# Patient Record
Sex: MALE | Race: BLACK OR AFRICAN AMERICAN | Employment: FULL TIME | URBAN - NONMETROPOLITAN AREA
[De-identification: names, ages, dates, MRNs, and addresses within clinical notes are randomized per-mention and may not be internally consistent; named-entity substitution may affect disease eponyms.]

---

## 2017-11-01 ENCOUNTER — HOSPITAL ENCOUNTER (EMERGENCY)
Age: 57
Discharge: HOME | End: 2017-11-01
Attending: EMERGENCY MEDICINE
Payer: COMMERCIAL

## 2017-11-01 VITALS
HEIGHT: 69 IN | DIASTOLIC BLOOD PRESSURE: 80 MMHG | SYSTOLIC BLOOD PRESSURE: 119 MMHG | RESPIRATION RATE: 16 BRPM | TEMPERATURE: 96.7 F | WEIGHT: 145 LBS | HEART RATE: 64 BPM | BODY MASS INDEX: 21.48 KG/M2 | OXYGEN SATURATION: 100 %

## 2017-11-01 DIAGNOSIS — K64.4 HEMORRHOIDS, EXTERNAL WITHOUT COMPLICATIONS: ICD-10-CM

## 2017-11-01 DIAGNOSIS — K40.90 REDUCIBLE LEFT INGUINAL HERNIA: Primary | ICD-10-CM

## 2017-11-01 PROCEDURE — 99283 EMERGENCY DEPT VISIT LOW MDM: CPT

## 2017-11-01 ASSESSMENT — ENCOUNTER SYMPTOMS
WHEEZING: 0
RHINORRHEA: 0
CONSTIPATION: 0
SORE THROAT: 0
SINUS PRESSURE: 0
NAUSEA: 0
TROUBLE SWALLOWING: 0
SHORTNESS OF BREATH: 0
VOICE CHANGE: 0
DIARRHEA: 0
BACK PAIN: 0
VOMITING: 0
CHEST TIGHTNESS: 0
ABDOMINAL PAIN: 0
COUGH: 0

## 2017-11-01 ASSESSMENT — PAIN SCALES - GENERAL
PAINLEVEL_OUTOF10: 1
PAINLEVEL_OUTOF10: 7

## 2017-11-01 ASSESSMENT — PAIN DESCRIPTION - LOCATION: LOCATION: ABDOMEN;RECTUM

## 2017-11-01 NOTE — ED PROVIDER NOTES
[address] [address]   eMERGENCY dEPARTMENT eNCOUnter        [facility]    Chief Complaint   Patient presents with    Hernia    Other     hemmorroids       Nurses Notes reviewed and I agree except as noted in the HPI. HPI    Afsaneh Weston is a 56 y.o. male who presents for evaluation of Left inguinal hernia which has been ongoing for a month, patient states that the hernia started small however is getting bigger lately. The patient denies any nausea no vomiting. Patient also has been complaining of hemorrhoid , this been ongoing for a while however it flared up from time to time and lately has been flaring up after he had bouts of constipation. To be any fever, no chills, no shortness of breath or chest pain    REVIEW OF SYSTEMS    Review of Systems   Constitutional: Negative for appetite change, chills, diaphoresis, fatigue and fever. HENT: Negative for congestion, ear discharge, ear pain, postnasal drip, rhinorrhea, sinus pressure, sore throat, trouble swallowing and voice change. Respiratory: Negative for cough, chest tightness, shortness of breath and wheezing. Cardiovascular: Negative for chest pain, palpitations and leg swelling. Gastrointestinal: Negative for abdominal pain, constipation, diarrhea, nausea and vomiting. Genitourinary:        Left inguinal hernia and hemorrhoid   Musculoskeletal: Negative for arthralgias, back pain, joint swelling, myalgias, neck pain and neck stiffness. Skin: Negative for rash. Neurological: Negative for dizziness, syncope, weakness, light-headedness, numbness and headaches. PAST MEDICAL HISTORY     has no past medical history on file. SURGICAL HISTORY     has a past surgical history that includes Abdomen surgery. CURRENT MEDICATIONS    Previous Medications    No medications on file       ALLERGIES    has No Known Allergies. FAMILY HISTORY    has no family status information on file.     family DECISION MAKING    DIFFERENTIAL DIAGNOSIS:  Inguinal hernia, reducible, external hemorrhoid      DIAGNOSTIC RESULTS    RADIOLOGY:    No orders to display         Vitals:    Vitals:    11/01/17 1109   BP: (!) 157/107   Pulse: 65   Resp: 16   Temp: 96.7 °F (35.9 °C)   SpO2: 100%   Weight: 145 lb (65.8 kg)   Height: 5' 9\" (1.753 m)       EMERGENCY DEPARTMENT COURSE:    Medications - No data to display    The pt was seen and evaluated by me. Within the department, I observed the pt's vital signs to be within acceptable range. I observed the pt's condition to Stable during the duration of their stay. I explained my proposed course of treatment to the pt, and they were amenable to my decision. They were discharged home, and they will return to the ED if their symptoms become more severe in nature, or otherwise change. Advised the patient regarding hot sitz baths , high-fiber diet avoiding constipation. Controlled Substances Monitoring:   Attestation: The Prescription Monitoring Report for this patient was reviewed today. Laxmi Bruce PA-C)  Documentation: No signs of potential drug abuse or diversion identified. Laxmi Bruce PA-C)    CRITICAL CARE:   None. CONSULTS:  None    PROCEDURES:  None. FINAL IMPRESSION       1. Reducible left inguinal hernia    2. Hemorrhoids, external without complications          DISPOSITION/PLAN  PATIENT REFERRED TO:  Eliu Chawla MD  [address] [place] [address].  [address] [facility]  [phone]    Call in 2 days  For follow-up appointment regarding hernia    DISCHARGE MEDICATIONS:  New Prescriptions    HYDROCORTISONE (ANUSOL-HC) 2.5 % RECTAL CREAM    Place rectally 2 times daily.  Apply after a hot sitz bath for 10 minutes       (Please note that portions of this note were completed with a voice recognition program.  Efforts were made to edit the dictations but occasionally words are mis-transcribed.)         11/01/17 11:55 AM      Marcelina Patel MD      Emergency room physician           Oscar Haney MD  11/01/17 7664

## 2017-11-01 NOTE — ED TRIAGE NOTES
Patient related, \"was seen at the clinic about 3 weeks ago and they said I needed some test. I thought they were going to set up some things here for me to have some tests. Well I never hear from them so I called them and they told me I needed to come here. I have something going on that when I have a bowel movement it hurts. I have had some blood when I go to the bath room for about a month. I have I think a hernia and it bothers me. Is there any way to fix that without surgery? Observed what appears to be a left inguinal hernia. \"

## 2017-11-01 NOTE — CARE COORDINATION
Brief ED Social Work Assessment  11/1/17, 11:48 AM    Patient stated that he recently moved to the area. He stated that he has insurance but needs a PCP. Provided an SRPS list as requested. Patient denied any needs.

## 2017-11-08 ENCOUNTER — OFFICE VISIT (OUTPATIENT)
Dept: SURGERY | Age: 57
End: 2017-11-08
Payer: COMMERCIAL

## 2017-11-08 ENCOUNTER — HOSPITAL ENCOUNTER (OUTPATIENT)
Age: 57
Discharge: HOME | End: 2017-11-08
Payer: COMMERCIAL

## 2017-11-08 ENCOUNTER — HOSPITAL ENCOUNTER (OUTPATIENT)
Dept: GENERAL RADIOLOGY | Age: 57
Discharge: HOME | End: 2017-11-08
Payer: COMMERCIAL

## 2017-11-08 ENCOUNTER — TELEPHONE (OUTPATIENT)
Dept: FAMILY MEDICINE CLINIC | Age: 57
End: 2017-11-08

## 2017-11-08 VITALS
WEIGHT: 148.3 LBS | OXYGEN SATURATION: 98 % | HEART RATE: 63 BPM | RESPIRATION RATE: 18 BRPM | HEIGHT: 69 IN | SYSTOLIC BLOOD PRESSURE: 136 MMHG | BODY MASS INDEX: 21.97 KG/M2 | DIASTOLIC BLOOD PRESSURE: 76 MMHG | TEMPERATURE: 95.7 F

## 2017-11-08 DIAGNOSIS — R30.0 DYSURIA: ICD-10-CM

## 2017-11-08 DIAGNOSIS — K40.90 INGUINAL HERNIA WITHOUT OBSTRUCTION OR GANGRENE, RECURRENCE NOT SPECIFIED, UNSPECIFIED LATERALITY: ICD-10-CM

## 2017-11-08 DIAGNOSIS — Z01.818 PRE-OP TESTING: ICD-10-CM

## 2017-11-08 DIAGNOSIS — R05.9 COUGH: ICD-10-CM

## 2017-11-08 DIAGNOSIS — K40.90 LEFT INGUINAL HERNIA: Primary | ICD-10-CM

## 2017-11-08 LAB
ANION GAP SERPL CALCULATED.3IONS-SCNC: 16 MEQ/L (ref 8–16)
ANISOCYTOSIS: ABNORMAL
BACTERIA: NORMAL
BASOPHILIA: SLIGHT
BASOPHILS # BLD: 0.9 %
BASOPHILS ABSOLUTE: 0.1 THOU/MM3 (ref 0–0.1)
BILIRUBIN URINE: NEGATIVE
BLOOD, URINE: NEGATIVE
BUN BLDV-MCNC: 12 MG/DL (ref 7–22)
CALCIUM SERPL-MCNC: 9.2 MG/DL (ref 8.5–10.5)
CASTS: NORMAL /LPF
CASTS: NORMAL /LPF
CHARACTER, URINE: NORMAL
CHLORIDE BLD-SCNC: 100 MEQ/L (ref 98–111)
CO2: 24 MEQ/L (ref 23–33)
COLOR: YELLOW
CREAT SERPL-MCNC: 0.8 MG/DL (ref 0.4–1.2)
CRYSTALS: NORMAL
EOSINOPHIL # BLD: 2.2 %
EOSINOPHILS ABSOLUTE: 0.1 THOU/MM3 (ref 0–0.4)
EPITHELIAL CELLS, UA: NORMAL /HPF
GFR SERPL CREATININE-BSD FRML MDRD: > 90 ML/MIN/1.73M2
GLUCOSE BLD-MCNC: 93 MG/DL (ref 70–108)
GLUCOSE, URINE: NEGATIVE MG/DL
HCT VFR BLD CALC: 42.6 % (ref 42–52)
HEMOGLOBIN: 13.7 GM/DL (ref 14–18)
HYPOCHROMIA: ABNORMAL
KETONES, URINE: NEGATIVE
LEUKOCYTE EST, POC: NEGATIVE
LYMPHOCYTES # BLD: 30.4 %
LYMPHOCYTES ABSOLUTE: 2 THOU/MM3 (ref 1–4.8)
MCH RBC QN AUTO: 22.9 PG (ref 27–31)
MCHC RBC AUTO-ENTMCNC: 32.1 GM/DL (ref 33–37)
MCV RBC AUTO: 71.4 FL (ref 80–94)
MICROCYTES: ABNORMAL
MISCELLANEOUS LAB TEST RESULT: NORMAL
MONOCYTES # BLD: 8.8 %
MONOCYTES ABSOLUTE: 0.6 THOU/MM3 (ref 0.4–1.3)
NITRITE, URINE: NEGATIVE
NUCLEATED RED BLOOD CELLS: 0 /100 WBC
PDW BLD-RTO: 16.6 % (ref 11.5–14.5)
PH UA: 5.5
PLATELET # BLD: 222 THOU/MM3 (ref 130–400)
PLATELET ESTIMATE: ADEQUATE
PMV BLD AUTO: 7.8 MCM (ref 7.4–10.4)
POIKILOCYTES: ABNORMAL
POTASSIUM SERPL-SCNC: 4.5 MEQ/L (ref 3.5–5.2)
PROTEIN UA: NEGATIVE MG/DL
RBC # BLD: 5.97 MILL/MM3 (ref 4.7–6.1)
RBC URINE: NORMAL /HPF
RENAL EPITHELIAL, UA: NORMAL
SCAN OF BLOOD SMEAR: NORMAL
SEG NEUTROPHILS: 57.7 %
SEGMENTED NEUTROPHILS ABSOLUTE COUNT: 3.9 THOU/MM3 (ref 1.8–7.7)
SODIUM BLD-SCNC: 140 MEQ/L (ref 135–145)
SPECIFIC GRAVITY UA: 1.02 (ref 1–1.03)
SPHEROCYTES: ABNORMAL
TARGET CELLS: ABNORMAL
UROBILINOGEN, URINE: 0.2 EU/DL
WBC # BLD: 6.7 THOU/MM3 (ref 4.8–10.8)
WBC UA: NORMAL /HPF
YEAST: NORMAL

## 2017-11-08 PROCEDURE — 81001 URINALYSIS AUTO W/SCOPE: CPT

## 2017-11-08 PROCEDURE — G8427 DOCREV CUR MEDS BY ELIG CLIN: HCPCS | Performed by: SURGERY

## 2017-11-08 PROCEDURE — 99243 OFF/OP CNSLTJ NEW/EST LOW 30: CPT | Performed by: SURGERY

## 2017-11-08 PROCEDURE — 3017F COLORECTAL CA SCREEN DOC REV: CPT | Performed by: SURGERY

## 2017-11-08 PROCEDURE — 36415 COLL VENOUS BLD VENIPUNCTURE: CPT

## 2017-11-08 PROCEDURE — G8420 CALC BMI NORM PARAMETERS: HCPCS | Performed by: SURGERY

## 2017-11-08 PROCEDURE — 71020 XR CHEST STANDARD TWO VW: CPT

## 2017-11-08 PROCEDURE — 80048 BASIC METABOLIC PNL TOTAL CA: CPT

## 2017-11-08 PROCEDURE — 85025 COMPLETE CBC W/AUTO DIFF WBC: CPT

## 2017-11-08 PROCEDURE — G8484 FLU IMMUNIZE NO ADMIN: HCPCS | Performed by: SURGERY

## 2017-11-08 RX ORDER — HYDROCODONE BITARTRATE AND ACETAMINOPHEN 5; 325 MG/1; MG/1
1 TABLET ORAL EVERY 6 HOURS PRN
Qty: 20 TABLET | Refills: 0 | Status: SHIPPED | OUTPATIENT
Start: 2017-11-08

## 2017-11-08 ASSESSMENT — ENCOUNTER SYMPTOMS
EYE PAIN: 0
WHEEZING: 0
SORE THROAT: 0
CHOKING: 0
TROUBLE SWALLOWING: 0
CONSTIPATION: 0
BLOOD IN STOOL: 1
COUGH: 1
COLOR CHANGE: 0
BACK PAIN: 0
EYE REDNESS: 0

## 2017-11-08 NOTE — TELEPHONE ENCOUNTER
Pt called office asking to become a New Pt. I transferred call to Pre Service to be scheduled with Toro Hamm NP and pt hung up before I could transfer him to Pre Service. We received a return call from pt and he left a  requesting a return call to schedule. Pt could not remember his phone number during the message, got frustrated and started saying profanities and then hung up the phone.

## 2017-11-08 NOTE — PROGRESS NOTES
Constitutional: Positive for appetite change. Negative for chills, fatigue and fever. HENT: Negative for sore throat and trouble swallowing. Eyes: Negative for pain and redness. Respiratory: Positive for cough. Negative for choking and wheezing. Cardiovascular: Negative for chest pain and palpitations. Gastrointestinal: Positive for blood in stool. Negative for constipation. Endocrine: Positive for heat intolerance. Negative for cold intolerance and polyuria. Genitourinary: Negative for decreased urine volume, dysuria and testicular pain. Musculoskeletal: Negative for arthralgias, back pain and joint swelling. Skin: Negative for color change, pallor and rash. Allergic/Immunologic: Negative for food allergies and immunocompromised state. Neurological: Negative for dizziness, light-headedness and headaches. Hematological: Negative for adenopathy. Does not bruise/bleed easily. Psychiatric/Behavioral: Negative for agitation, behavioral problems and confusion. OBJECTIVE     /76 (Site: Right Arm, Position: Sitting, Cuff Size: Medium Adult)   Pulse 63   Temp 95.7 °F (35.4 °C) (Tympanic)   Resp 18   Ht 5' 9\" (1.753 m)   Wt 148 lb 4.8 oz (67.3 kg)   SpO2 98%   BMI 21.90 kg/m²     Physical Exam   Constitutional: He is oriented to person, place, and time. He appears well-developed and well-nourished. No distress. Thin   HENT:   Head: Normocephalic and atraumatic. Eyes: Conjunctivae are normal. Pupils are equal, round, and reactive to light. Left eye exhibits no discharge. No scleral icterus. Neck: Neck supple. No JVD present. Cardiovascular: Normal rate and normal heart sounds. No murmur heard. Pulmonary/Chest: Effort normal and breath sounds normal. No respiratory distress. He has no wheezes. Abdominal: Soft. Bowel sounds are normal. He exhibits mass. He exhibits no distension. There is no tenderness. A hernia is present.  Hernia confirmed positive in the left inguinal area. Musculoskeletal: Normal range of motion. He exhibits no edema or deformity. Lymphadenopathy:     He has no cervical adenopathy. Neurological: He is alert and oriented to person, place, and time. No cranial nerve deficit. Skin: Skin is warm and dry. No rash noted. No pallor. Psychiatric: He has a normal mood and affect. His behavior is normal. Thought content normal.   Nursing note and vitals reviewed.       Lab Results   Component Value Date    WBC 6.7 11/08/2017    HGB 13.7 (L) 11/08/2017    HCT 42.6 11/08/2017     11/08/2017

## 2017-11-13 PROBLEM — K40.90 UNILATERAL INGUINAL HERNIA: Status: ACTIVE | Noted: 2017-11-13

## 2017-11-14 ENCOUNTER — ANESTHESIA (OUTPATIENT)
Dept: OPERATING ROOM | Age: 57
End: 2017-11-14
Payer: COMMERCIAL

## 2017-11-14 ENCOUNTER — HOSPITAL ENCOUNTER (OUTPATIENT)
Age: 57
Setting detail: OUTPATIENT SURGERY
Discharge: HOME | End: 2017-11-14
Attending: SURGERY | Admitting: SURGERY
Payer: COMMERCIAL

## 2017-11-14 ENCOUNTER — ANESTHESIA EVENT (OUTPATIENT)
Dept: OPERATING ROOM | Age: 57
End: 2017-11-14
Payer: COMMERCIAL

## 2017-11-14 VITALS
TEMPERATURE: 97.7 F | HEIGHT: 69 IN | WEIGHT: 145.28 LBS | SYSTOLIC BLOOD PRESSURE: 124 MMHG | DIASTOLIC BLOOD PRESSURE: 85 MMHG | HEART RATE: 57 BPM | OXYGEN SATURATION: 100 % | BODY MASS INDEX: 21.52 KG/M2 | RESPIRATION RATE: 16 BRPM

## 2017-11-14 VITALS
OXYGEN SATURATION: 99 % | RESPIRATION RATE: 6 BRPM | SYSTOLIC BLOOD PRESSURE: 202 MMHG | DIASTOLIC BLOOD PRESSURE: 124 MMHG | TEMPERATURE: 14.4 F

## 2017-11-14 PROCEDURE — 7100000010 HC PHASE II RECOVERY - FIRST 15 MIN: Performed by: SURGERY

## 2017-11-14 PROCEDURE — 3700000000 HC ANESTHESIA ATTENDED CARE: Performed by: SURGERY

## 2017-11-14 PROCEDURE — 6370000000 HC RX 637 (ALT 250 FOR IP)

## 2017-11-14 PROCEDURE — 2580000003 HC RX 258: Performed by: SURGERY

## 2017-11-14 PROCEDURE — 7100000000 HC PACU RECOVERY - FIRST 15 MIN: Performed by: SURGERY

## 2017-11-14 PROCEDURE — 3600000019 HC SURGERY ROBOT ADDTL 15MIN: Performed by: SURGERY

## 2017-11-14 PROCEDURE — 6360000002 HC RX W HCPCS: Performed by: SPECIALIST

## 2017-11-14 PROCEDURE — 2500000003 HC RX 250 WO HCPCS: Performed by: SPECIALIST

## 2017-11-14 PROCEDURE — 6360000002 HC RX W HCPCS: Performed by: SURGERY

## 2017-11-14 PROCEDURE — 7100000011 HC PHASE II RECOVERY - ADDTL 15 MIN: Performed by: SURGERY

## 2017-11-14 PROCEDURE — 7100000001 HC PACU RECOVERY - ADDTL 15 MIN: Performed by: SURGERY

## 2017-11-14 PROCEDURE — 2500000003 HC RX 250 WO HCPCS: Performed by: SURGERY

## 2017-11-14 PROCEDURE — S2900 ROBOTIC SURGICAL SYSTEM: HCPCS | Performed by: SURGERY

## 2017-11-14 PROCEDURE — C1781 MESH (IMPLANTABLE): HCPCS | Performed by: SURGERY

## 2017-11-14 PROCEDURE — 2500000003 HC RX 250 WO HCPCS: Performed by: ANESTHESIOLOGY

## 2017-11-14 PROCEDURE — 49650 LAP ING HERNIA REPAIR INIT: CPT | Performed by: SURGERY

## 2017-11-14 PROCEDURE — 3700000001 HC ADD 15 MINUTES (ANESTHESIA): Performed by: SURGERY

## 2017-11-14 PROCEDURE — 3600000009 HC SURGERY ROBOT BASE: Performed by: SURGERY

## 2017-11-14 PROCEDURE — 6360000002 HC RX W HCPCS: Performed by: ANESTHESIOLOGY

## 2017-11-14 DEVICE — MESH HERN W10XL15CM POLY POLYLACTIC ACID 70% CLLGN 30% GLYC: Type: IMPLANTABLE DEVICE | Site: PELVIS | Status: FUNCTIONAL

## 2017-11-14 RX ORDER — HYDROCODONE BITARTRATE AND ACETAMINOPHEN 5; 325 MG/1; MG/1
TABLET ORAL
Status: COMPLETED
Start: 2017-11-14 | End: 2017-11-14

## 2017-11-14 RX ORDER — SODIUM CHLORIDE 0.9 % (FLUSH) 0.9 %
10 SYRINGE (ML) INJECTION PRN
Status: DISCONTINUED | OUTPATIENT
Start: 2017-11-14 | End: 2017-11-14 | Stop reason: HOSPADM

## 2017-11-14 RX ORDER — LIDOCAINE HYDROCHLORIDE 20 MG/ML
INJECTION, SOLUTION EPIDURAL; INFILTRATION; INTRACAUDAL; PERINEURAL PRN
Status: DISCONTINUED | OUTPATIENT
Start: 2017-11-14 | End: 2017-11-14 | Stop reason: SDUPTHER

## 2017-11-14 RX ORDER — NEOSTIGMINE METHYLSULFATE 1 MG/ML
INJECTION, SOLUTION INTRAVENOUS PRN
Status: DISCONTINUED | OUTPATIENT
Start: 2017-11-14 | End: 2017-11-14 | Stop reason: SDUPTHER

## 2017-11-14 RX ORDER — BUPIVACAINE HYDROCHLORIDE AND EPINEPHRINE 5; 5 MG/ML; UG/ML
INJECTION, SOLUTION EPIDURAL; INTRACAUDAL; PERINEURAL PRN
Status: DISCONTINUED | OUTPATIENT
Start: 2017-11-14 | End: 2017-11-14 | Stop reason: HOSPADM

## 2017-11-14 RX ORDER — ONDANSETRON 2 MG/ML
INJECTION INTRAMUSCULAR; INTRAVENOUS PRN
Status: DISCONTINUED | OUTPATIENT
Start: 2017-11-14 | End: 2017-11-14 | Stop reason: SDUPTHER

## 2017-11-14 RX ORDER — GLYCOPYRROLATE 0.2 MG/ML
INJECTION INTRAMUSCULAR; INTRAVENOUS PRN
Status: DISCONTINUED | OUTPATIENT
Start: 2017-11-14 | End: 2017-11-14 | Stop reason: SDUPTHER

## 2017-11-14 RX ORDER — DEXAMETHASONE SODIUM PHOSPHATE 4 MG/ML
INJECTION, SOLUTION INTRA-ARTICULAR; INTRALESIONAL; INTRAMUSCULAR; INTRAVENOUS; SOFT TISSUE PRN
Status: DISCONTINUED | OUTPATIENT
Start: 2017-11-14 | End: 2017-11-14 | Stop reason: SDUPTHER

## 2017-11-14 RX ORDER — ACETAMINOPHEN 650 MG/1
650 SUPPOSITORY RECTAL EVERY 4 HOURS PRN
Status: DISCONTINUED | OUTPATIENT
Start: 2017-11-14 | End: 2017-11-14 | Stop reason: HOSPADM

## 2017-11-14 RX ORDER — DIAZEPAM 5 MG/1
5 TABLET ORAL ONCE
Status: COMPLETED | OUTPATIENT
Start: 2017-11-14 | End: 2017-11-14

## 2017-11-14 RX ORDER — MIDAZOLAM HYDROCHLORIDE 1 MG/ML
INJECTION INTRAMUSCULAR; INTRAVENOUS PRN
Status: DISCONTINUED | OUTPATIENT
Start: 2017-11-14 | End: 2017-11-14 | Stop reason: SDUPTHER

## 2017-11-14 RX ORDER — SODIUM CHLORIDE 0.9 % (FLUSH) 0.9 %
10 SYRINGE (ML) INJECTION EVERY 12 HOURS SCHEDULED
Status: DISCONTINUED | OUTPATIENT
Start: 2017-11-14 | End: 2017-11-14 | Stop reason: HOSPADM

## 2017-11-14 RX ORDER — FENTANYL CITRATE 50 UG/ML
INJECTION, SOLUTION INTRAMUSCULAR; INTRAVENOUS PRN
Status: DISCONTINUED | OUTPATIENT
Start: 2017-11-14 | End: 2017-11-14 | Stop reason: SDUPTHER

## 2017-11-14 RX ORDER — FENTANYL CITRATE 50 UG/ML
50 INJECTION, SOLUTION INTRAMUSCULAR; INTRAVENOUS EVERY 5 MIN PRN
Status: DISCONTINUED | OUTPATIENT
Start: 2017-11-14 | End: 2017-11-14 | Stop reason: HOSPADM

## 2017-11-14 RX ORDER — LABETALOL HYDROCHLORIDE 5 MG/ML
INJECTION, SOLUTION INTRAVENOUS PRN
Status: DISCONTINUED | OUTPATIENT
Start: 2017-11-14 | End: 2017-11-14 | Stop reason: SDUPTHER

## 2017-11-14 RX ORDER — HYDROCODONE BITARTRATE AND ACETAMINOPHEN 5; 325 MG/1; MG/1
1 TABLET ORAL EVERY 4 HOURS PRN
Qty: 42 TABLET | Refills: 0 | Status: SHIPPED | OUTPATIENT
Start: 2017-11-14 | End: 2017-11-21

## 2017-11-14 RX ORDER — ONDANSETRON 2 MG/ML
4 INJECTION INTRAMUSCULAR; INTRAVENOUS EVERY 6 HOURS PRN
Status: DISCONTINUED | OUTPATIENT
Start: 2017-11-14 | End: 2017-11-14 | Stop reason: HOSPADM

## 2017-11-14 RX ORDER — MEPERIDINE HYDROCHLORIDE 25 MG/ML
12.5 INJECTION INTRAMUSCULAR; INTRAVENOUS; SUBCUTANEOUS EVERY 5 MIN PRN
Status: DISCONTINUED | OUTPATIENT
Start: 2017-11-14 | End: 2017-11-14 | Stop reason: HOSPADM

## 2017-11-14 RX ORDER — DIAZEPAM 5 MG/1
TABLET ORAL
Status: COMPLETED
Start: 2017-11-14 | End: 2017-11-14

## 2017-11-14 RX ORDER — PROMETHAZINE HYDROCHLORIDE 25 MG/ML
12.5 INJECTION, SOLUTION INTRAMUSCULAR; INTRAVENOUS
Status: DISCONTINUED | OUTPATIENT
Start: 2017-11-14 | End: 2017-11-14 | Stop reason: HOSPADM

## 2017-11-14 RX ORDER — HYDROCODONE BITARTRATE AND ACETAMINOPHEN 5; 325 MG/1; MG/1
1 TABLET ORAL EVERY 4 HOURS PRN
Status: DISCONTINUED | OUTPATIENT
Start: 2017-11-14 | End: 2017-11-14 | Stop reason: HOSPADM

## 2017-11-14 RX ORDER — SODIUM CHLORIDE 9 MG/ML
INJECTION, SOLUTION INTRAVENOUS CONTINUOUS
Status: DISCONTINUED | OUTPATIENT
Start: 2017-11-14 | End: 2017-11-14 | Stop reason: HOSPADM

## 2017-11-14 RX ORDER — PROPOFOL 10 MG/ML
INJECTION, EMULSION INTRAVENOUS PRN
Status: DISCONTINUED | OUTPATIENT
Start: 2017-11-14 | End: 2017-11-14 | Stop reason: SDUPTHER

## 2017-11-14 RX ORDER — ACETAMINOPHEN 325 MG/1
650 TABLET ORAL EVERY 4 HOURS PRN
Status: DISCONTINUED | OUTPATIENT
Start: 2017-11-14 | End: 2017-11-14 | Stop reason: HOSPADM

## 2017-11-14 RX ORDER — HYDROCODONE BITARTRATE AND ACETAMINOPHEN 5; 325 MG/1; MG/1
2 TABLET ORAL EVERY 4 HOURS PRN
Status: DISCONTINUED | OUTPATIENT
Start: 2017-11-14 | End: 2017-11-14 | Stop reason: HOSPADM

## 2017-11-14 RX ORDER — ROCURONIUM BROMIDE 10 MG/ML
INJECTION, SOLUTION INTRAVENOUS PRN
Status: DISCONTINUED | OUTPATIENT
Start: 2017-11-14 | End: 2017-11-14 | Stop reason: SDUPTHER

## 2017-11-14 RX ORDER — FENTANYL CITRATE 50 UG/ML
25 INJECTION, SOLUTION INTRAMUSCULAR; INTRAVENOUS EVERY 5 MIN PRN
Status: DISCONTINUED | OUTPATIENT
Start: 2017-11-14 | End: 2017-11-14 | Stop reason: HOSPADM

## 2017-11-14 RX ORDER — LABETALOL HYDROCHLORIDE 5 MG/ML
5 INJECTION, SOLUTION INTRAVENOUS EVERY 10 MIN PRN
Status: DISCONTINUED | OUTPATIENT
Start: 2017-11-14 | End: 2017-11-14 | Stop reason: HOSPADM

## 2017-11-14 RX ADMIN — PROPOFOL 200 MG: 10 INJECTION, EMULSION INTRAVENOUS at 07:39

## 2017-11-14 RX ADMIN — ROCURONIUM BROMIDE 10 MG: 10 INJECTION INTRAVENOUS at 08:05

## 2017-11-14 RX ADMIN — MIDAZOLAM 2 MG: 1 INJECTION INTRAMUSCULAR; INTRAVENOUS at 07:27

## 2017-11-14 RX ADMIN — FENTANYL CITRATE 25 MCG: 50 INJECTION INTRAMUSCULAR; INTRAVENOUS at 09:19

## 2017-11-14 RX ADMIN — ONDANSETRON 4 MG: 2 INJECTION INTRAMUSCULAR; INTRAVENOUS at 09:06

## 2017-11-14 RX ADMIN — LIDOCAINE HYDROCHLORIDE 100 MG: 20 INJECTION, SOLUTION EPIDURAL; INFILTRATION; INTRACAUDAL; PERINEURAL at 07:39

## 2017-11-14 RX ADMIN — HYDROCODONE BITARTRATE AND ACETAMINOPHEN 2 TABLET: 5; 325 TABLET ORAL at 10:38

## 2017-11-14 RX ADMIN — DEXAMETHASONE SODIUM PHOSPHATE 8 MG: 4 INJECTION, SOLUTION INTRAMUSCULAR; INTRAVENOUS at 07:50

## 2017-11-14 RX ADMIN — MEPERIDINE HYDROCHLORIDE 12.5 MG: 25 INJECTION, SOLUTION INTRAMUSCULAR; INTRAVENOUS; SUBCUTANEOUS at 09:47

## 2017-11-14 RX ADMIN — SODIUM CHLORIDE: 9 INJECTION, SOLUTION INTRAVENOUS at 07:23

## 2017-11-14 RX ADMIN — GLYCOPYRROLATE 0.5 MG: 0.2 INJECTION, SOLUTION INTRAMUSCULAR; INTRAVENOUS at 09:10

## 2017-11-14 RX ADMIN — WATER 2 G: 1 INJECTION INTRAMUSCULAR; INTRAVENOUS; SUBCUTANEOUS at 07:45

## 2017-11-14 RX ADMIN — DIAZEPAM 5 MG: 5 TABLET ORAL at 11:20

## 2017-11-14 RX ADMIN — FENTANYL CITRATE 50 MCG: 50 INJECTION INTRAMUSCULAR; INTRAVENOUS at 07:27

## 2017-11-14 RX ADMIN — FENTANYL CITRATE 25 MCG: 50 INJECTION INTRAMUSCULAR; INTRAVENOUS at 09:15

## 2017-11-14 RX ADMIN — LABETALOL HYDROCHLORIDE 10 MG: 5 INJECTION, SOLUTION INTRAVENOUS at 09:27

## 2017-11-14 RX ADMIN — FENTANYL CITRATE 50 MCG: 50 INJECTION INTRAMUSCULAR; INTRAVENOUS at 08:05

## 2017-11-14 RX ADMIN — SODIUM CHLORIDE: 9 INJECTION, SOLUTION INTRAVENOUS at 09:20

## 2017-11-14 RX ADMIN — FENTANYL CITRATE 50 MCG: 50 INJECTION INTRAMUSCULAR; INTRAVENOUS at 09:32

## 2017-11-14 RX ADMIN — NEOSTIGMINE METHYLSULFATE 2.5 MG: 1 INJECTION, SOLUTION INTRAVENOUS at 09:10

## 2017-11-14 RX ADMIN — PHENYLEPHRINE HYDROCHLORIDE 100 MCG: 10 INJECTION INTRAMUSCULAR; INTRAVENOUS; SUBCUTANEOUS at 07:52

## 2017-11-14 RX ADMIN — GLYCOPYRROLATE 0.1 MG: 0.2 INJECTION, SOLUTION INTRAMUSCULAR; INTRAVENOUS at 07:27

## 2017-11-14 RX ADMIN — ROCURONIUM BROMIDE 40 MG: 10 INJECTION INTRAVENOUS at 07:39

## 2017-11-14 RX ADMIN — LABETALOL HYDROCHLORIDE 5 MG: 5 INJECTION, SOLUTION INTRAVENOUS at 08:08

## 2017-11-14 RX ADMIN — MEPERIDINE HYDROCHLORIDE 12.5 MG: 25 INJECTION, SOLUTION INTRAMUSCULAR; INTRAVENOUS; SUBCUTANEOUS at 09:40

## 2017-11-14 RX ADMIN — LABETALOL HYDROCHLORIDE 5 MG: 5 INJECTION INTRAVENOUS at 09:45

## 2017-11-14 RX ADMIN — LABETALOL HYDROCHLORIDE 10 MG: 5 INJECTION, SOLUTION INTRAVENOUS at 08:15

## 2017-11-14 ASSESSMENT — PULMONARY FUNCTION TESTS
PIF_VALUE: 21
PIF_VALUE: 22
PIF_VALUE: 19
PIF_VALUE: 19
PIF_VALUE: 13
PIF_VALUE: 22
PIF_VALUE: 22
PIF_VALUE: 0
PIF_VALUE: 16
PIF_VALUE: 0
PIF_VALUE: 13
PIF_VALUE: 21
PIF_VALUE: 19
PIF_VALUE: 20
PIF_VALUE: 19
PIF_VALUE: 21
PIF_VALUE: 25
PIF_VALUE: 13
PIF_VALUE: 14
PIF_VALUE: 21
PIF_VALUE: 3
PIF_VALUE: 20
PIF_VALUE: 20
PIF_VALUE: 5
PIF_VALUE: 14
PIF_VALUE: 14
PIF_VALUE: 22
PIF_VALUE: 21
PIF_VALUE: 18
PIF_VALUE: 19
PIF_VALUE: 14
PIF_VALUE: 21
PIF_VALUE: 22
PIF_VALUE: 21
PIF_VALUE: 14
PIF_VALUE: 20
PIF_VALUE: 19
PIF_VALUE: 21
PIF_VALUE: 22
PIF_VALUE: 13
PIF_VALUE: 20
PIF_VALUE: 8
PIF_VALUE: 21
PIF_VALUE: 13
PIF_VALUE: 21
PIF_VALUE: 20
PIF_VALUE: 20
PIF_VALUE: 21
PIF_VALUE: 14
PIF_VALUE: 22
PIF_VALUE: 17
PIF_VALUE: 21
PIF_VALUE: 21
PIF_VALUE: 14
PIF_VALUE: 1
PIF_VALUE: 21
PIF_VALUE: 12
PIF_VALUE: 22
PIF_VALUE: 22
PIF_VALUE: 21
PIF_VALUE: 22
PIF_VALUE: 21
PIF_VALUE: 21
PIF_VALUE: 19
PIF_VALUE: 21
PIF_VALUE: 21
PIF_VALUE: 0
PIF_VALUE: 20
PIF_VALUE: 14
PIF_VALUE: 0
PIF_VALUE: 22
PIF_VALUE: 18
PIF_VALUE: 20
PIF_VALUE: 18
PIF_VALUE: 13
PIF_VALUE: 21
PIF_VALUE: 21
PIF_VALUE: 19
PIF_VALUE: 21
PIF_VALUE: 13
PIF_VALUE: 22
PIF_VALUE: 13
PIF_VALUE: 12
PIF_VALUE: 21
PIF_VALUE: 18
PIF_VALUE: 21
PIF_VALUE: 22
PIF_VALUE: 0
PIF_VALUE: 21
PIF_VALUE: 14
PIF_VALUE: 21
PIF_VALUE: 21
PIF_VALUE: 22
PIF_VALUE: 17
PIF_VALUE: 0
PIF_VALUE: 21
PIF_VALUE: 20
PIF_VALUE: 21
PIF_VALUE: 15
PIF_VALUE: 22
PIF_VALUE: 21
PIF_VALUE: 21
PIF_VALUE: 13
PIF_VALUE: 14
PIF_VALUE: 14
PIF_VALUE: 20
PIF_VALUE: 21
PIF_VALUE: 14
PIF_VALUE: 14
PIF_VALUE: 13
PIF_VALUE: 22
PIF_VALUE: 5

## 2017-11-14 ASSESSMENT — PAIN SCALES - GENERAL
PAINLEVEL_OUTOF10: 9
PAINLEVEL_OUTOF10: 10
PAINLEVEL_OUTOF10: 4
PAINLEVEL_OUTOF10: 10
PAINLEVEL_OUTOF10: 5
PAINLEVEL_OUTOF10: 9

## 2017-11-14 ASSESSMENT — PAIN DESCRIPTION - DESCRIPTORS: DESCRIPTORS: ACHING

## 2017-11-14 ASSESSMENT — PAIN - FUNCTIONAL ASSESSMENT: PAIN_FUNCTIONAL_ASSESSMENT: 0-10

## 2017-11-14 ASSESSMENT — LIFESTYLE VARIABLES: SMOKING_STATUS: 1

## 2017-11-14 NOTE — PROGRESS NOTES
Admitted and oriented to SDS. Fall risk band applied, education material given. Bilat. SCDs applied.

## 2017-11-14 NOTE — PROGRESS NOTES
Called Dr Tk Guillen in regards to patient concern about hernia. She went in and talked to patient about his concerns. She wants us to apply dressing in umbilicus for drainage. She order patient to have valium.
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
## 2017-11-14 NOTE — H&P
[address] [address]  History and Physical Update    Pt Name: Christi Gross  MRN: [id number]  YOB: 1960  Date of evaluation: 11/14/2017    [x] I have examined the patient and reviewed the H&P/Consult and there are no changes to the patient or plans.     [] I have examined the patient and reviewed the H&P/Consult and have noted the following changes:        Birdie Chao MD  Electronically signed 11/14/2017 at 7:04 AM

## 2017-11-14 NOTE — ANESTHESIA POSTPROCEDURE EVALUATION
Department of Anesthesiology  Postprocedure Note    Patient: Sandhya Flow  MRN: [id number]  YOB: 1960  Date of evaluation: 11/14/2017  Time:  11:06 AM     Procedure Summary     Date:  11/14/17 Room / Location:  [facility] OR 06 / [facility] OR    Anesthesia Start:  0732 Anesthesia Stop:  4423    Procedure:  LAPAROSCOPIC LYSIS OF ADHESIONS, ROBOTIC LEFT INGUINAL HERNIA WITH MESH (Left Abdomen) Diagnosis:  (LEFT INGUINAL HERNIA)    Surgeon:  Hannah Banegas MD Responsible Provider:  Tavares iGll DO    Anesthesia Type:  general ASA Status:  2          Anesthesia Type: general    Jason Phase I: Jason Score: 8    Jason Phase II: Jason Score: 9    Last vitals: Reviewed and per EMR flowsheets.        Anesthesia Post Evaluation    Patient location during evaluation: PACU  Patient participation: complete - patient participated  Level of consciousness: awake and alert  Airway patency: patent  Nausea & Vomiting: no nausea and no vomiting  Complications: no  Cardiovascular status: hemodynamically stable  Respiratory status: acceptable  Hydration status: stable

## 2017-11-15 ENCOUNTER — TELEPHONE (OUTPATIENT)
Dept: SURGERY | Age: 57
End: 2017-11-15

## 2017-11-15 NOTE — TELEPHONE ENCOUNTER
Called patient to check on post op. Doing well, in some pain but taking medications. Up moving around. Instructed patient to take stool softners for constipation. Provided call back number for questions or concerns.
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
## 2021-10-20 ENCOUNTER — HOSPITAL ENCOUNTER (OUTPATIENT)
Age: 61
Setting detail: SPECIMEN
Discharge: HOME | End: 2021-10-20
Payer: COMMERCIAL

## 2021-10-20 LAB
CHOLESTEROL/HDL RATIO: 1.7
CHOLESTEROL: 175 MG/DL
HDLC SERPL-MCNC: 101 MG/DL
LDL CHOLESTEROL: 64 MG/DL (ref 0–130)
TRIGL SERPL-MCNC: 52 MG/DL
VLDLC SERPL CALC-MCNC: NORMAL MG/DL (ref 1–30)

## 2021-10-21 LAB
ESTIMATED AVERAGE GLUCOSE: 128 MG/DL
HBA1C MFR BLD: 6.1 % (ref 4–6)

## 2023-03-14 ENCOUNTER — OFFICE VISIT (OUTPATIENT)
Dept: FAMILY MEDICINE CLINIC | Age: 63
End: 2023-03-14
Payer: COMMERCIAL

## 2023-03-14 VITALS
OXYGEN SATURATION: 98 % | RESPIRATION RATE: 18 BRPM | SYSTOLIC BLOOD PRESSURE: 132 MMHG | DIASTOLIC BLOOD PRESSURE: 80 MMHG | TEMPERATURE: 98.1 F | BODY MASS INDEX: 21.24 KG/M2 | HEART RATE: 67 BPM | WEIGHT: 143.4 LBS | HEIGHT: 69 IN

## 2023-03-14 DIAGNOSIS — Z13.29 SCREENING FOR THYROID DISORDER: ICD-10-CM

## 2023-03-14 DIAGNOSIS — Z87.891 HISTORY OF TOBACCO USE: ICD-10-CM

## 2023-03-14 DIAGNOSIS — Z12.5 PROSTATE CANCER SCREENING: ICD-10-CM

## 2023-03-14 DIAGNOSIS — L30.1 DYSHIDROTIC ECZEMA: Primary | ICD-10-CM

## 2023-03-14 DIAGNOSIS — Z13.31 DEPRESSION SCREENING: ICD-10-CM

## 2023-03-14 DIAGNOSIS — Z12.11 COLON CANCER SCREENING: ICD-10-CM

## 2023-03-14 DIAGNOSIS — R21 PENILE RASH: ICD-10-CM

## 2023-03-14 DIAGNOSIS — G89.29 CHRONIC MIDLINE LOW BACK PAIN WITHOUT SCIATICA: ICD-10-CM

## 2023-03-14 DIAGNOSIS — Z13.220 LIPID SCREENING: ICD-10-CM

## 2023-03-14 DIAGNOSIS — Z87.891 PERSONAL HISTORY OF TOBACCO USE: ICD-10-CM

## 2023-03-14 DIAGNOSIS — M54.50 CHRONIC MIDLINE LOW BACK PAIN WITHOUT SCIATICA: ICD-10-CM

## 2023-03-14 PROCEDURE — G0296 VISIT TO DETERM LDCT ELIG: HCPCS | Performed by: STUDENT IN AN ORGANIZED HEALTH CARE EDUCATION/TRAINING PROGRAM

## 2023-03-14 PROCEDURE — G0444 DEPRESSION SCREEN ANNUAL: HCPCS | Performed by: STUDENT IN AN ORGANIZED HEALTH CARE EDUCATION/TRAINING PROGRAM

## 2023-03-14 PROCEDURE — 99204 OFFICE O/P NEW MOD 45 MIN: CPT | Performed by: STUDENT IN AN ORGANIZED HEALTH CARE EDUCATION/TRAINING PROGRAM

## 2023-03-14 SDOH — ECONOMIC STABILITY: HOUSING INSECURITY
IN THE LAST 12 MONTHS, WAS THERE A TIME WHEN YOU DID NOT HAVE A STEADY PLACE TO SLEEP OR SLEPT IN A SHELTER (INCLUDING NOW)?: NO

## 2023-03-14 SDOH — ECONOMIC STABILITY: INCOME INSECURITY: HOW HARD IS IT FOR YOU TO PAY FOR THE VERY BASICS LIKE FOOD, HOUSING, MEDICAL CARE, AND HEATING?: NOT VERY HARD

## 2023-03-14 SDOH — ECONOMIC STABILITY: FOOD INSECURITY: WITHIN THE PAST 12 MONTHS, THE FOOD YOU BOUGHT JUST DIDN'T LAST AND YOU DIDN'T HAVE MONEY TO GET MORE.: NEVER TRUE

## 2023-03-14 SDOH — ECONOMIC STABILITY: FOOD INSECURITY: WITHIN THE PAST 12 MONTHS, YOU WORRIED THAT YOUR FOOD WOULD RUN OUT BEFORE YOU GOT MONEY TO BUY MORE.: NEVER TRUE

## 2023-03-14 ASSESSMENT — PATIENT HEALTH QUESTIONNAIRE - PHQ9
SUM OF ALL RESPONSES TO PHQ QUESTIONS 1-9: 0
2. FEELING DOWN, DEPRESSED OR HOPELESS: 0
SUM OF ALL RESPONSES TO PHQ QUESTIONS 1-9: 0
SUM OF ALL RESPONSES TO PHQ9 QUESTIONS 1 & 2: 0
1. LITTLE INTEREST OR PLEASURE IN DOING THINGS: 0

## 2023-03-14 ASSESSMENT — ENCOUNTER SYMPTOMS
BLOOD IN STOOL: 0
SHORTNESS OF BREATH: 0
BACK PAIN: 1
ABDOMINAL PAIN: 0
NAUSEA: 0
VOMITING: 0

## 2023-03-14 NOTE — PATIENT INSTRUCTIONS

## 2023-03-14 NOTE — PROGRESS NOTES
Robert Corey (:  1960) is a 63 y.o. male,{New vs Established:[id number]::\"Established patient\"}, here for evaluation of the following chief complaint(s):  Establish Care (Pt presents to establish care. ), Hand Problem (Pt presents c/o bilateral hand skin issues. The issue is also in his groin. ), and Back Pain (Pt presents c/o intermittent lower back pain which started a few months ago. He has tried Tylenol PM with some relief. )         ASSESSMENT/PLAN:  {There are no diagnoses linked to this encounter. (Refresh or delete this SmartLink)}    No follow-ups on file. Subjective   SUBJECTIVE/OBJECTIVE:  Hands/groin - started 2-4 months ago; painful, dry, itchy, in the webspace of bilateral hands and groin, put on peroxide with no relief, petroleum jelly as a barrier  Had to wrap up in bandages when at work  Never tried cortisone or fungal, Benadryl  Was painful on the groin but better now, just at the head, painful during sex    No travel recently     Back - started at the same time, mid back, better when moving around, worse when laying down - Ibuprofen PM for relief throughout the night   No hx of similar pain    Hand Problem    Back Pain      Review of Systems   Musculoskeletal:  Positive for back pain. Objective   Physical Exam       {Time Documentation Optional:[id number]}      An electronic signature was used to authenticate this note.     --Marcin Zazueta

## 2023-03-14 NOTE — PROGRESS NOTES
[id number] [facility] Alem MEJIA. [address] [address]  Dept: [phone]  Dept Fax: [phone]  Loc: [phone]      Robert Corey is a 63 y.o. male who presents today for:  Chief Complaint   Patient presents with    Establish Care     Pt presents to establish care. Hand Problem     Pt presents c/o bilateral hand skin issues. The issue is also in his groin. Back Pain     Pt presents c/o intermittent lower back pain which started a few months ago. He has tried Tylenol PM with some relief. HPI:   Robert Corey is 63 y.o. with no known medical history who presents today for complaints of skin rash, penile rash, and low back pain and to establish care. Has been dealing with skin issues of bilateral hands in the web spaces. When first noticed a few months ago was very painful and itchy, since then has improved some. Has been applying petroleum jelly and bandaids prior to work. Has not tried cortisone, benadryl or other topicals. Also has noticed similar skin changes in his groin. Once had pus drainage from the hand. Hands have been getting better every day over the last week, was cracking open and bleeding a lot about a week ago. States has also noticed sin changes on the glans of the penis, very painful with intercourse. Wife had itching after intercourse and was tested for a yeast infection and was negative. Has not had the pain on the penis for a few months since had intercourse, does not hurt with masturbation. Has been having mid low back pain for the last few months additionally. Worse at night with sleep, occasionally wakes him up from sleep. Not bad ambulating during the day. Getting better overall. Does not radiate down the legs. No red flag symptoms including bowel or urinary incontinence or numbness in the groin. Ibuprofen has been providing some relief. Has not seen or been evaluated for before.  No trauma or injury. States everything came on once and is afraid is cancer. States has been having lots of sleep issues, having lots of sweating at night, less appetite, has had unintentional weight loss. Objective:     Vitals:    03/14/23 1038   BP: 132/80   Pulse: 67   Resp: 18   Temp: 98.1 °F (36.7 °C)   SpO2: 98%         Wt Readings from Last 3 Encounters:   03/14/23 143 lb 6.4 oz (65 kg)   11/14/17 145 lb 4.5 oz (65.9 kg)   11/08/17 148 lb 4.8 oz (67.3 kg)       BP Readings from Last 3 Encounters:   03/14/23 132/80   11/14/17 124/85   11/14/17 (!) 202/124       Lab Results   Component Value Date    WBC 6.7 11/08/2017    HGB 13.7 (L) 11/08/2017    HCT 42.6 11/08/2017    MCV 71.4 (L) 11/08/2017     11/08/2017     Lab Results   Component Value Date     11/08/2017    K 4.5 11/08/2017     11/08/2017    CO2 24 11/08/2017    BUN 12 11/08/2017    CREATININE 0.8 11/08/2017    GLUCOSE 93 11/08/2017    CALCIUM 9.2 11/08/2017    LABGLOM >90 11/08/2017     No results found for: TSH, C7WBYYX, U5AWCRO, THYROIDAB, FT3, T4FREE  Lab Results   Component Value Date    LABA1C 6.1 (H) 10/20/2021     Lab Results   Component Value Date     10/20/2021     Lab Results   Component Value Date    CHOL 175 10/20/2021     Lab Results   Component Value Date    TRIG 52 10/20/2021     Lab Results   Component Value Date     10/20/2021     Lab Results   Component Value Date    LDLCHOLESTEROL 64 10/20/2021     No results found for: PSA, PSADIA  No results found for: [id number]  No results found for: VITD25       No results found for: LABMICR, [id number]    Review of Systems   Constitutional:  Negative for chills and fever. Respiratory:  Negative for shortness of breath. Cardiovascular:  Negative for chest pain and palpitations. Gastrointestinal:  Negative for abdominal pain, blood in stool, nausea and vomiting. Genitourinary:  Positive for penile pain.  Negative for dysuria, hematuria, penile discharge, penile swelling and scrotal swelling. Musculoskeletal:  Positive for back pain. Skin:  Positive for rash. Neurological:  Negative for dizziness and light-headedness. Physical Exam  Constitutional:       General: He is not in acute distress. Appearance: He is not ill-appearing. Comments: Frail   HENT:      Head: Normocephalic and atraumatic. Nose: Nose normal.      Mouth/Throat:      Mouth: Mucous membranes are moist.   Cardiovascular:      Rate and Rhythm: Normal rate and regular rhythm. Pulses: Normal pulses. Heart sounds: Normal heart sounds. No murmur heard. No gallop. Pulmonary:      Effort: Pulmonary effort is normal. No respiratory distress. Breath sounds: Normal breath sounds. No wheezing. Abdominal:      General: Abdomen is flat. There is no distension. Palpations: Abdomen is soft. Tenderness: There is no abdominal tenderness. Genitourinary:     Testes: Normal.      Comments: Dry skin and flaking noted on at the 12 0 clock position on the glans of the penis with no appreciable erythema, discharge, or swelling. Musculoskeletal:         General: No swelling or tenderness. Normal range of motion. Comments: No tenderness on low back exam, states pain is deep and midline of lumbar region      Skin:     General: Skin is warm. Findings: Lesion present. Comments: See attached images of bilateral hands for breakdown of skin and lesions noted at bases of digits on bilateral hands. Neurological:      General: No focal deficit present. Mental Status: He is alert and oriented to person, place, and time. There is no immunization history on file for this patient.     Health Maintenance Due   Topic Date Due    COVID-19 Vaccine (1) Never done    Pneumococcal 0-64 years Vaccine (1 - PCV) Never done    Depression Screen  Never done    HIV screen  Never done    Hepatitis C screen  Never done    DTaP/Tdap/Td vaccine (1 - Tdap) Never done    Colorectal Cancer Screen  Never done    Shingles vaccine (1 of 2) Never done    Low dose CT lung screening  Never done    Flu vaccine (1) Never done    A1C test (Diabetic or Prediabetic)  10/20/2022       Food Insecurity: No Food Insecurity    Worried About Running Out of Food in the Last Year: Never true    Ran Out of Food in the Last Year: Never true       Assessment / Plan:   1. Dyshidrotic eczema  - Findings on exam appear consistent with dyshidrotic eczema  - Begin Kenalog ointment, monitor for improvement of symptoms. Patient instructed specifically to not additionally apply kenalog ointment to the glans of the penis as well   - triamcinolone (KENALOG) 0.1 % ointment; Apply topically 2 times daily for 7 days Apply topically to bilateral hands  Dispense: 30 g; Refill: 1    2. Penile rash  - Unclear etiology, will begin routine STD testing at this time   - C. Trachomatis / N. Gonorrhoeae, DNA Probe  - Trichomonas Vaginalis by HAKEEM; Future  - RPR; Future    3. Chronic midline low back pain without sciatica  - Due to chronicity, risk factors and pain being deep and midline, will order X-Ray for further evaluation and order appropriate cancer screenings   - XR LUMBAR SPINE (2-3 VIEWS); Future    4. Colon cancer screening  - AFL (Epic) - Dawson Martin MD, Gastroenterology, [facility]    5. History of tobacco use  - 40 pack year smoking history, LD CT ordered   - CBC with Auto Differential; Future  - Comprehensive Metabolic Panel; Future    6. Personal history of tobacco use  - Plan as above   - [facility] VISIT TO DISCUSS LUNG CA SCREEN W LDCT  - CT Lung Screen (Annual/Baseline); Future    7. Lipid screening  - Lipid, Fasting; Future    8. Screening for thyroid disorder  - TSH; Future  - T4, Free; Future    9. Prostate cancer screening  - PSA Prostatic Specific Antigen; Future    10.  Depression screening  - [facility] DEPRESSION SCREEN ANNUAL      Patient has history and risk factors that warrant rule out of appropriate cancer screening interventions, including night sweats, unintentional weight loss, smoking history, and chronic deep low back pain. Appropriate screenings ordered as above, will continue to follow. Return in about 4 weeks (around 4/11/2023). Future Appointments   Date Time Provider Maxime Schmitt   3/28/2023  9:00 AM GovernFREDY Mckenzie - CNP [facility]         Patient given educational materials - see patient instructions. Discussed use, benefit, and sideeffects of prescribed medications. All patient questions answered. Pt voiced understanding. Reviewed health maintenance. Instructed to continue current medications, diet and exercise. Patient agreed with treatment plan. Follow up as directed. Electronically signed by Boom Jimenez DO on 3/14/2023 at 6:30 PM  Discussed with the patient the current USPSTF guidelines released March 9, 2021 for screening for lung cancer. For adults aged 48 to [de-identified] years who have a 20 pack-year smoking history and currently smoke or have quit within the past 15 years the grade B recommendation is to:  Screen for lung cancer with low-dose computed tomography (LDCT) every year. Stop screening once a person has not smoked for 15 years or has a health problem that limits life expectancy or the ability to have lung surgery. The patient  reports that he has been smoking cigarettes. He started smoking about 40 years ago. He has a 40.00 pack-year smoking history. He has never used smokeless tobacco.. Discussed with patient the risks and benefits of screening, including over-diagnosis, false positive rate, and total radiation exposure. The patient currently exhibits no signs or symptoms suggestive of lung cancer. Discussed with patient the importance of compliance with yearly annual lung cancer screenings and willingness to undergo diagnosis and treatment if screening scan is positive.   In addition, the patient was counseled regarding the importance of remaining smoke free and/or total smoking cessation.     Also reviewed the following if the patient has Medicare that as of February 10, 2022, Medicare only covers LDCT screening in patients aged 51-72 with at least a 20 pack-year smoking history who currently smoke or have quit in the last 15 years

## 2023-03-23 ENCOUNTER — TELEPHONE (OUTPATIENT)
Dept: FAMILY MEDICINE CLINIC | Age: 63
End: 2023-03-23

## 2023-03-23 NOTE — TELEPHONE ENCOUNTER
Labs reviewed that were scanned in under media tab. Please have patient schedule appointment for follow up, was planning for 4 week f/u from last appointment and we will discuss the lab results at that time. No significant abnormalities noted that require new orders at this time.     Dr. Alberto Tariq

## 2023-03-30 ENCOUNTER — TELEPHONE (OUTPATIENT)
Dept: FAMILY MEDICINE CLINIC | Age: 63
End: 2023-03-30

## 2023-03-30 NOTE — TELEPHONE ENCOUNTER
----- Message from Vincent Romero sent at 3/30/2023  9:21 AM EDT -----  Subject: Refill Request    QUESTIONS  Name of Medication? triamcinolone (KENALOG) 0.1 % ointment  Patient-reported dosage and instructions? Apply topically 2 times daily   for 7 days Apply topically to bilateral hands . 1%  How many days do you have left? 0  Preferred Pharmacy? [address] [facility] #[id number]  Pharmacy phone number (if available)? [phone]  ---------------------------------------------------------------------------  --------------  CALL BACK INFO  What is the best way for the office to contact you? OK to leave message on   voicemail  Preferred Call Back Phone Number? [phone]  ---------------------------------------------------------------------------  --------------  SCRIPT ANSWERS  Relationship to Patient?  Self

## 2023-03-30 NOTE — TELEPHONE ENCOUNTER
I had put a refill on this prescription, has he gotten this refill as well? He should not be out of this prescription yet at this point, can we please check with the patient? Thanks!     Dr. Emerald Knight

## 2023-03-30 NOTE — TELEPHONE ENCOUNTER
Spoke to pt's sister (on HIPAA). Pt has not gotten the refill yet. They will call the pharmacy about this.

## 2023-03-30 NOTE — TELEPHONE ENCOUNTER
Patient's last appointment was : 3/14/23  Patient's next appointment is :  4/18/23  Last refilled: 3/14/23  [facility] [place] Verified    Lab Results   Component Value Date    LABA1C 6.1 (H) 10/20/2021     Lab Results   Component Value Date    CHOL 175 10/20/2021    TRIG 52 10/20/2021     10/20/2021     Lab Results   Component Value Date     11/08/2017    K 4.5 11/08/2017     11/08/2017    CO2 24 11/08/2017    BUN 12 11/08/2017    CREATININE 0.8 11/08/2017    GLUCOSE 93 11/08/2017    CALCIUM 9.2 11/08/2017    LABGLOM >90 11/08/2017     No results found for: TSH, I4YEUVJ, I6VWASB, THYROIDAB, FT3, T4FREE  Lab Results   Component Value Date    WBC 6.7 11/08/2017    HGB 13.7 (L) 11/08/2017    HCT 42.6 11/08/2017    MCV 71.4 (L) 11/08/2017     11/08/2017

## 2023-04-04 ENCOUNTER — HOSPITAL ENCOUNTER (OUTPATIENT)
Dept: CT IMAGING | Age: 63
Discharge: HOME | End: 2023-04-04
Payer: COMMERCIAL

## 2023-04-04 DIAGNOSIS — Z87.891 PERSONAL HISTORY OF TOBACCO USE: ICD-10-CM

## 2023-04-04 PROCEDURE — 71271 CT THORAX LUNG CANCER SCR C-: CPT

## 2023-04-18 ENCOUNTER — OFFICE VISIT (OUTPATIENT)
Dept: FAMILY MEDICINE CLINIC | Age: 63
End: 2023-04-18
Payer: COMMERCIAL

## 2023-04-18 VITALS
HEIGHT: 69 IN | WEIGHT: 136.8 LBS | SYSTOLIC BLOOD PRESSURE: 130 MMHG | OXYGEN SATURATION: 98 % | RESPIRATION RATE: 16 BRPM | HEART RATE: 77 BPM | TEMPERATURE: 98.1 F | DIASTOLIC BLOOD PRESSURE: 86 MMHG | BODY MASS INDEX: 20.26 KG/M2

## 2023-04-18 DIAGNOSIS — R79.89 ELEVATED LFTS: ICD-10-CM

## 2023-04-18 DIAGNOSIS — F52.4 PREMATURE EJACULATION: ICD-10-CM

## 2023-04-18 DIAGNOSIS — Z11.4 ENCOUNTER FOR SCREENING FOR HIV: ICD-10-CM

## 2023-04-18 DIAGNOSIS — Z86.11 HISTORY OF TUBERCULOSIS: ICD-10-CM

## 2023-04-18 DIAGNOSIS — R63.4 WEIGHT LOSS: Primary | ICD-10-CM

## 2023-04-18 DIAGNOSIS — Z87.891 PERSONAL HISTORY OF TOBACCO USE: ICD-10-CM

## 2023-04-18 DIAGNOSIS — Z11.59 NEED FOR HEPATITIS C SCREENING TEST: ICD-10-CM

## 2023-04-18 DIAGNOSIS — Z87.891 HISTORY OF TOBACCO USE: ICD-10-CM

## 2023-04-18 DIAGNOSIS — L30.1 DYSHIDROTIC ECZEMA: ICD-10-CM

## 2023-04-18 PROCEDURE — 99214 OFFICE O/P EST MOD 30 MIN: CPT | Performed by: STUDENT IN AN ORGANIZED HEALTH CARE EDUCATION/TRAINING PROGRAM

## 2023-04-18 RX ORDER — PAROXETINE HYDROCHLORIDE 20 MG/1
20 TABLET, FILM COATED ORAL DAILY
Qty: 30 TABLET | Refills: 1 | Status: SHIPPED | OUTPATIENT
Start: 2023-04-18

## 2023-04-18 RX ORDER — VARENICLINE TARTRATE
1 KIT DAILY
Qty: 1 EACH | Refills: 0 | Status: SHIPPED | OUTPATIENT
Start: 2023-04-18

## 2023-04-18 ASSESSMENT — ENCOUNTER SYMPTOMS
ABDOMINAL PAIN: 0
COUGH: 0
SHORTNESS OF BREATH: 0
VOMITING: 0
WHEEZING: 0
NAUSEA: 0
BLOOD IN STOOL: 0

## 2023-04-20 NOTE — PROGRESS NOTES
After pharmacist chart review, the following recommendations are made:    Patient is overdue for A1c test and would benefit from [address] Only    Program: [facility]  CPA in place:  No  Recommendation Provided To: Provider: 2 via Note to Provider  Intervention Detail: Lab(s) Ordered and Vaccine Recommended/Administered  Intervention Accepted By: Provider: pending  Gap Closed?: Yes   Time Spent (min): Catherine Patterson (Tem[place]) , PGY1 Pharmacy Resident 4/18/2023 1:40 PM
Attending Physician Statement  I have discussed the case, including pertinent history and exam findings with the resident. I agree with the documented assessment and plan as documented by the resident.   GE modifier added to this encounter      Ariel Hollis MD 4/19/2023 9:23 PM
Health Maintenance Due   Topic Date Due    COVID-19 Vaccine (1) Never done    Pneumococcal 0-64 years Vaccine (1 - PCV) Never done    HIV screen  Never done    Hepatitis C screen  Never done    DTaP/Tdap/Td vaccine (1 - Tdap) Never done    Shingles vaccine (1 of 2) Never done    A1C test (Diabetic or Prediabetic)  10/20/2022
Date     11/08/2017    K 4.5 11/08/2017     11/08/2017    CO2 24 11/08/2017    BUN 12 11/08/2017    CREATININE 0.8 11/08/2017    GLUCOSE 93 11/08/2017    CALCIUM 9.2 11/08/2017    LABGLOM >90 11/08/2017     No results found for: TSH, F3YMXRA, R2YEESW, THYROIDAB, FT3, T4FREE  Lab Results   Component Value Date    LABA1C 6.1 (H) 10/20/2021     Lab Results   Component Value Date     10/20/2021     Lab Results   Component Value Date    CHOL 175 10/20/2021     Lab Results   Component Value Date    TRIG 52 10/20/2021     Lab Results   Component Value Date     10/20/2021     Lab Results   Component Value Date    LDLCHOLESTEROL 64 10/20/2021     No results found for: PSA, PSADIA  No results found for: [id number]  No results found for: VITD25       No results found for: LABMICR, [id number]    Review of Systems   Constitutional:  Positive for appetite change and unexpected weight change. Negative for fatigue and fever. Respiratory:  Negative for cough, shortness of breath and wheezing. Cardiovascular:  Negative for chest pain and palpitations. Gastrointestinal:  Negative for abdominal pain, blood in stool, nausea and vomiting. Genitourinary:  Negative for hematuria. Musculoskeletal:  Negative for arthralgias and myalgias. Physical Exam  Constitutional:       Appearance: Normal appearance. HENT:      Head: Normocephalic and atraumatic. Right Ear: External ear normal.      Left Ear: External ear normal.      Mouth/Throat:      Mouth: Mucous membranes are moist.   Cardiovascular:      Rate and Rhythm: Regular rhythm. Heart sounds: Normal heart sounds. No murmur heard. No gallop. Pulmonary:      Effort: Pulmonary effort is normal. No respiratory distress. Breath sounds: Normal breath sounds. No wheezing. Abdominal:      General: Abdomen is flat. There is no distension. Palpations: Abdomen is soft. Tenderness: There is no abdominal tenderness.

## 2023-04-24 ENCOUNTER — TELEPHONE (OUTPATIENT)
Dept: FAMILY MEDICINE CLINIC | Age: 63
End: 2023-04-24

## 2023-07-15 DIAGNOSIS — L30.1 DYSHIDROTIC ECZEMA: ICD-10-CM

## 2023-07-18 NOTE — TELEPHONE ENCOUNTER
Patient's last appointment was : 4-18-23  Patient's next appointment is :  n/a  Last refilled: 4-18-23  Pharmacy Verified    Lab Results   Component Value Date    LABA1C 6.1 (H) 10/20/2021     Lab Results   Component Value Date    CHOL 175 10/20/2021    TRIG 52 10/20/2021     10/20/2021     Lab Results   Component Value Date     11/08/2017    K 4.5 11/08/2017     11/08/2017    CO2 24 11/08/2017    BUN 12 11/08/2017    CREATININE 0.8 11/08/2017    GLUCOSE 93 11/08/2017    CALCIUM 9.2 11/08/2017    LABGLOM >90 11/08/2017     No results found for: TSH, S2YMCQV, G3RQNZI, THYROIDAB, FT3, T4FREE  Lab Results   Component Value Date    WBC 6.7 11/08/2017    HGB 13.7 (L) 11/08/2017    HCT 42.6 11/08/2017    MCV 71.4 (L) 11/08/2017     11/08/2017

## 2023-08-28 ENCOUNTER — OFFICE VISIT (OUTPATIENT)
Dept: FAMILY MEDICINE CLINIC | Age: 63
End: 2023-08-28
Payer: COMMERCIAL

## 2023-08-28 VITALS
HEIGHT: 69 IN | OXYGEN SATURATION: 98 % | SYSTOLIC BLOOD PRESSURE: 136 MMHG | DIASTOLIC BLOOD PRESSURE: 96 MMHG | BODY MASS INDEX: 20.38 KG/M2 | HEART RATE: 96 BPM | WEIGHT: 137.6 LBS | RESPIRATION RATE: 18 BRPM | TEMPERATURE: 97.6 F

## 2023-08-28 DIAGNOSIS — R79.89 ELEVATED LFTS: ICD-10-CM

## 2023-08-28 DIAGNOSIS — Z87.891 PERSONAL HISTORY OF TOBACCO USE: ICD-10-CM

## 2023-08-28 DIAGNOSIS — F52.4 PREMATURE EJACULATION: ICD-10-CM

## 2023-08-28 DIAGNOSIS — L30.1 DYSHIDROTIC ECZEMA: Primary | ICD-10-CM

## 2023-08-28 DIAGNOSIS — R73.03 PRE-DIABETES: ICD-10-CM

## 2023-08-28 PROCEDURE — 99214 OFFICE O/P EST MOD 30 MIN: CPT

## 2023-08-28 RX ORDER — NICOTINE 21 MG/24HR
1 PATCH, TRANSDERMAL 24 HOURS TRANSDERMAL DAILY
Qty: 30 PATCH | Refills: 1 | Status: SHIPPED | OUTPATIENT
Start: 2023-08-28 | End: 2023-10-27

## 2023-08-28 RX ORDER — CLOTRIMAZOLE 1 %
CREAM (GRAM) TOPICAL
Qty: 40 G | Refills: 1 | Status: SHIPPED | OUTPATIENT
Start: 2023-08-28 | End: 2023-09-04

## 2023-08-30 ASSESSMENT — ENCOUNTER SYMPTOMS
NAUSEA: 0
DIARRHEA: 0
VOMITING: 0

## (undated) DEVICE — [HIGH FLOW INSUFFLATOR,  DO NOT USE IF PACKAGE IS DAMAGED,  KEEP DRY,  KEEP AWAY FROM SUNLIGHT,  PROTECT FROM HEAT AND RADIOACTIVE SOURCES.]: Brand: PNEUMOSURE

## (undated) DEVICE — GOWN,SIRUS,NON REINFRCD,LARGE,SET IN SL: Brand: MEDLINE

## (undated) DEVICE — TROCAR: Brand: KII FIOS FIRST ENTRY

## (undated) DEVICE — RED RUBBER ROBINSON URETHRAL CATHETER, RADIOPAQUE SMOOTH ROUNDED TIP, 16 FR (5.3 MM): Brand: DOVER

## (undated) DEVICE — SOLUTION IV IRRIG WATER 1000ML POUR BRL 2F7114

## (undated) DEVICE — CANNULA SEAL

## (undated) DEVICE — SOLUTION ANTIFOG VIS SYS CLEARIFY LAPSCP

## (undated) DEVICE — SOLUTION IV 1000ML 0.9% SOD CHL PH 5 INJ USP VIAFLX PLAS

## (undated) DEVICE — BLADE CLIPPER GEN PURP NS

## (undated) DEVICE — BASIC SINGLE BASIN BTC-LF: Brand: MEDLINE INDUSTRIES, INC.

## (undated) DEVICE — COAGULATOR ELECSURG BLADE 10 FTX1 IN PTFE STRL ULTRACLEAN

## (undated) DEVICE — THE S40 STERILANT CONCENTRATE IS A SINGLE USE PERACETIC ACID-BASED CHEMISTRY USED IN THE SYSTEM 1E AND SYSTEM 1 ENDO LIQUID CHEMICAL STERILANT PROCESSING SYSTEMS.: Brand: S40

## (undated) DEVICE — GENERAL LAPAROSCOPY PACK-LF: Brand: MEDLINE INDUSTRIES, INC.

## (undated) DEVICE — Device

## (undated) DEVICE — INTENDED FOR TISSUE SEPARATION, AND OTHER PROCEDURES THAT REQUIRE A SHARP SURGICAL BLADE TO PUNCTURE OR CUT.: Brand: BARD-PARKER ® CARBON RIB-BACK BLADES

## (undated) DEVICE — SKIN AFFIX SURG ADHESIVE 72/CS 0.55ML: Brand: MEDLINE

## (undated) DEVICE — SUTURE V-LOC 180 SZ 3-0 L9IN ABSRB GRN L26MM V-20 1/2 CIR VLOCL0644

## (undated) DEVICE — MEDI-VAC NON-CONDUCTIVE SUCTION TUBING 6MM X 6.1M (20 FT.) L: Brand: CARDINAL HEALTH

## (undated) DEVICE — TIP COVER ACCESSORY

## (undated) DEVICE — UNIVERSAL MONOPOLAR LAPAROSCOPIC CABLE 10FT, 4MM PIN CONNECTOR: Brand: CONMED

## (undated) DEVICE — GLOVE ORANGE PI 7 1/2   MSG9075

## (undated) DEVICE — JELLY,LUBE,STERILE,FLIP TOP,TUBE,2-OZ: Brand: MEDLINE

## (undated) DEVICE — SYRINGE MED 30ML STD CLR PLAS LUERLOCK TIP N CTRL DISP

## (undated) DEVICE — GLOVE SURG SZ 65 THK91MIL LTX FREE SYN POLYISOPRENE

## (undated) DEVICE — ELECTRO LUBE IS A SINGLE PATIENT USE DEVICE THAT IS INTENDED TO BE USED ON ELECTROSURGICAL ELECTRODES TO REDUCE STICKING.: Brand: KEY SURGICAL ELECTRO LUBE

## (undated) DEVICE — GLOVE ORANGE PI 7   MSG9070

## (undated) DEVICE — BLADELESS OBTURATOR: Brand: WECK VISTA